# Patient Record
Sex: FEMALE | Race: WHITE | NOT HISPANIC OR LATINO | Employment: UNEMPLOYED | URBAN - METROPOLITAN AREA
[De-identification: names, ages, dates, MRNs, and addresses within clinical notes are randomized per-mention and may not be internally consistent; named-entity substitution may affect disease eponyms.]

---

## 2023-12-11 ENCOUNTER — OFFICE VISIT (OUTPATIENT)
Dept: PRIMARY CARE | Facility: CLINIC | Age: 58
End: 2023-12-11
Payer: COMMERCIAL

## 2023-12-11 ENCOUNTER — LAB (OUTPATIENT)
Dept: LAB | Facility: LAB | Age: 58
End: 2023-12-11
Payer: COMMERCIAL

## 2023-12-11 VITALS
OXYGEN SATURATION: 98 % | BODY MASS INDEX: 18.83 KG/M2 | DIASTOLIC BLOOD PRESSURE: 78 MMHG | WEIGHT: 120 LBS | HEART RATE: 64 BPM | SYSTOLIC BLOOD PRESSURE: 128 MMHG | TEMPERATURE: 97.5 F | HEIGHT: 67 IN

## 2023-12-11 DIAGNOSIS — Z00.00 ANNUAL PHYSICAL EXAM: ICD-10-CM

## 2023-12-11 DIAGNOSIS — Z12.31 ENCOUNTER FOR SCREENING MAMMOGRAM FOR BREAST CANCER: ICD-10-CM

## 2023-12-11 DIAGNOSIS — Z00.00 ANNUAL PHYSICAL EXAM: Primary | ICD-10-CM

## 2023-12-11 LAB
25(OH)D3 SERPL-MCNC: 55 NG/ML (ref 30–100)
ALBUMIN SERPL BCP-MCNC: 4.6 G/DL (ref 3.4–5)
ALP SERPL-CCNC: 49 U/L (ref 33–136)
ALT SERPL W P-5'-P-CCNC: 18 U/L (ref 7–45)
ANION GAP SERPL CALC-SCNC: 13 MMOL/L (ref 10–20)
AST SERPL W P-5'-P-CCNC: 20 U/L (ref 9–39)
BILIRUB SERPL-MCNC: 0.7 MG/DL (ref 0–1.2)
BUN SERPL-MCNC: 14 MG/DL (ref 6–23)
CALCIUM SERPL-MCNC: 9.8 MG/DL (ref 8.6–10.6)
CHLORIDE SERPL-SCNC: 101 MMOL/L (ref 98–107)
CHOLEST SERPL-MCNC: 158 MG/DL (ref 0–199)
CHOLESTEROL/HDL RATIO: 2.7
CO2 SERPL-SCNC: 28 MMOL/L (ref 21–32)
CREAT SERPL-MCNC: 0.53 MG/DL (ref 0.5–1.05)
EST. AVERAGE GLUCOSE BLD GHB EST-MCNC: 111 MG/DL
GFR SERPL CREATININE-BSD FRML MDRD: >90 ML/MIN/1.73M*2
GLUCOSE SERPL-MCNC: 101 MG/DL (ref 74–99)
HBA1C MFR BLD: 5.5 %
HCV AB SER QL: NONREACTIVE
HDLC SERPL-MCNC: 57.6 MG/DL
LDLC SERPL CALC-MCNC: 90 MG/DL
NON HDL CHOLESTEROL: 100 MG/DL (ref 0–149)
POTASSIUM SERPL-SCNC: 4.1 MMOL/L (ref 3.5–5.3)
PROT SERPL-MCNC: 7.4 G/DL (ref 6.4–8.2)
SODIUM SERPL-SCNC: 138 MMOL/L (ref 136–145)
TRIGL SERPL-MCNC: 53 MG/DL (ref 0–149)
TSH SERPL-ACNC: 2.31 MIU/L (ref 0.44–3.98)
VLDL: 11 MG/DL (ref 0–40)

## 2023-12-11 PROCEDURE — 80053 COMPREHEN METABOLIC PANEL: CPT

## 2023-12-11 PROCEDURE — 82306 VITAMIN D 25 HYDROXY: CPT

## 2023-12-11 PROCEDURE — 86803 HEPATITIS C AB TEST: CPT

## 2023-12-11 PROCEDURE — 84443 ASSAY THYROID STIM HORMONE: CPT

## 2023-12-11 PROCEDURE — 1036F TOBACCO NON-USER: CPT | Performed by: FAMILY MEDICINE

## 2023-12-11 PROCEDURE — 99386 PREV VISIT NEW AGE 40-64: CPT | Performed by: FAMILY MEDICINE

## 2023-12-11 PROCEDURE — 83036 HEMOGLOBIN GLYCOSYLATED A1C: CPT

## 2023-12-11 PROCEDURE — 80061 LIPID PANEL: CPT

## 2023-12-11 PROCEDURE — 36415 COLL VENOUS BLD VENIPUNCTURE: CPT

## 2023-12-11 RX ORDER — DORZOLAMIDE HYDROCHLORIDE AND TIMOLOL MALEATE 20; 5 MG/ML; MG/ML
SOLUTION/ DROPS OPHTHALMIC
COMMUNITY
End: 2024-04-04 | Stop reason: WASHOUT

## 2023-12-11 RX ORDER — CRISABOROLE 20 MG/G
OINTMENT TOPICAL 2 TIMES DAILY
COMMUNITY
End: 2024-04-04 | Stop reason: WASHOUT

## 2023-12-11 RX ORDER — CHOLECALCIFEROL (VITAMIN D3) 25 MCG
2000 TABLET ORAL
COMMUNITY
End: 2024-04-04 | Stop reason: WASHOUT

## 2023-12-11 ASSESSMENT — PATIENT HEALTH QUESTIONNAIRE - PHQ9
1. LITTLE INTEREST OR PLEASURE IN DOING THINGS: NOT AT ALL
2. FEELING DOWN, DEPRESSED OR HOPELESS: NOT AT ALL
SUM OF ALL RESPONSES TO PHQ9 QUESTIONS 1 AND 2: 0

## 2023-12-11 ASSESSMENT — COLUMBIA-SUICIDE SEVERITY RATING SCALE - C-SSRS
2. HAVE YOU ACTUALLY HAD ANY THOUGHTS OF KILLING YOURSELF?: NO
1. IN THE PAST MONTH, HAVE YOU WISHED YOU WERE DEAD OR WISHED YOU COULD GO TO SLEEP AND NOT WAKE UP?: NO
6. HAVE YOU EVER DONE ANYTHING, STARTED TO DO ANYTHING, OR PREPARED TO DO ANYTHING TO END YOUR LIFE?: NO

## 2023-12-11 ASSESSMENT — LIFESTYLE VARIABLES: HOW OFTEN DO YOU HAVE A DRINK CONTAINING ALCOHOL: 2-4 TIMES A MONTH

## 2023-12-11 NOTE — PROGRESS NOTES
"Subjective   Patient ID: Lynn Ross is a 58 y.o. female who presents for New Patient Visit (New patient is here to establish care. Need BW orders ).  Very pleasant 58-year-old here today to establish and here for wellness exam  No significant past medical history  Patient is not a smoker  Family history reviewed  Has not been to the doctor recently but feels well  No angina palpitations or syncope no exertional symptoms  No fever chills or night sweats  Patient is postmenopausal no bleeding no change in bowel habits no  issues takes a few supplements with no side effects        Review of Systems  Constitutional: no chills, no fever and no night sweats.   Eyes: no blurred vision and no eyesight problems.   ENT: no hearing loss, no nasal congestion, no nasal discharge, no hoarseness and no sore throat.   Cardiovascular: no chest pain, no intermittent leg claudication, no lower extremity edema, no palpitations and no syncope.   Respiratory: no cough, no shortness of breath during exertion, no shortness of breath at rest and no wheezing.   Gastrointestinal: no abdominal pain, no blood in stools, no constipation, no diarrhea, no melena, no nausea, no rectal pain and no vomiting.   Genitourinary: no dysuria, no change in urinary frequency, no urinary hesitancy, no feelings of urinary urgency and no vaginal discharge.   Musculoskeletal: no arthralgias,  no back pain and no myalgias.   Integumentary: no new skin lesions and no rashes.   Neurological: no difficulty walking, no headache, no limb weakness, no numbness and no tingling.   Psychiatric: no anxiety, no depression, no anhedonia and no substance use disorders.   Endocrine: no recent weight gain and no recent weight loss.   Hematologic/Lymphatic: no tendency for easy bruising and no swollen glands .    Objective    /78   Pulse 64   Temp 36.4 °C (97.5 °F)   Ht 1.702 m (5' 7\")   Wt 54.4 kg (120 lb)   SpO2 98%   BMI 18.79 kg/m²    Physical Exam  The " patient appeared well nourished and normally developed. Vital signs as documented. Head exam is unremarkable. No scleral icterus or corneal arcus noted.  Pupils are equal round reactive to light extraocular movements are intact no hemorrhages noted on funduscopic exam mouth mucous membranes are moist no exudates ears canals clear TMs are gray pearly not injected nose no rhinorrhea or epistaxis Neck is without jugular venous distension, thyromegaly, or carotid bruits. Carotid upstrokes are brisk bilaterally. Lungs are clear to auscultation and percussion. Cardiac exam reveals the PMI to be normally sized and situated. Rhythm is regular. First and second heart sounds normal. No murmurs, rubs or gallops. Abdominal exam reveals normal bowel sounds, no masses, no organomegaly and no aortic enlargement. Extremities are nonedematous and both femoral and pedal pulses are normal.  Neurologic exam DTRs are equal bilaterally no focal deficits strength is symmetrical heme lymph no palpable lymph nodes in the neck axilla or groin  Pes excavatum   Assessment/Plan   Problem List Items Addressed This Visit       Annual physical exam - Primary     Unremarkable physical exam  Continue annual exams         Relevant Orders    Comprehensive Metabolic Panel (Completed)    Lipid Panel (Completed)    Hemoglobin A1C (Completed)    Vitamin D 25-Hydroxy,Total (for eval of Vitamin D levels) (Completed)    Hepatitis C antibody (Completed)    TSH (Completed)     Other Visit Diagnoses       Encounter for screening mammogram for breast cancer        Relevant Orders    BI mammo bilateral screening tomosynthesis                 Ivet Mendez MD

## 2024-01-12 ENCOUNTER — ANCILLARY PROCEDURE (OUTPATIENT)
Dept: RADIOLOGY | Facility: CLINIC | Age: 59
End: 2024-01-12
Payer: COMMERCIAL

## 2024-01-12 DIAGNOSIS — Z00.00 ANNUAL PHYSICAL EXAM: Primary | ICD-10-CM

## 2024-01-12 DIAGNOSIS — Z12.31 ENCOUNTER FOR SCREENING MAMMOGRAM FOR BREAST CANCER: ICD-10-CM

## 2024-01-12 PROCEDURE — 77067 SCR MAMMO BI INCL CAD: CPT | Performed by: RADIOLOGY

## 2024-01-12 PROCEDURE — 77067 SCR MAMMO BI INCL CAD: CPT

## 2024-01-12 PROCEDURE — 77063 BREAST TOMOSYNTHESIS BI: CPT | Performed by: RADIOLOGY

## 2024-02-01 ENCOUNTER — HOSPITAL ENCOUNTER (OUTPATIENT)
Dept: RADIOLOGY | Facility: EXTERNAL LOCATION | Age: 59
Discharge: HOME | End: 2024-02-01

## 2024-02-13 LAB
NON-UH HIE BASO COUNT: 0.05 X1000 (ref 0–0.2)
NON-UH HIE BASOS %: 1.2 %
NON-UH HIE DIFF?: NO
NON-UH HIE EOS COUNT: 0.08 X1000 (ref 0–0.5)
NON-UH HIE EOSIN %: 2 %
NON-UH HIE HCT: 42.1 % (ref 36–46)
NON-UH HIE HGB: 14 G/DL (ref 12–16)
NON-UH HIE INSTR WBC: 4.3
NON-UH HIE LYMPH %: 25 %
NON-UH HIE LYMPH COUNT: 1.07 X1000 (ref 1.2–4.8)
NON-UH HIE MCH: 31.7 PG (ref 27–34)
NON-UH HIE MCHC: 33.2 G/DL (ref 32–37)
NON-UH HIE MCV: 95.3 FL (ref 80–100)
NON-UH HIE MONO %: 11.5 %
NON-UH HIE MONO COUNT: 0.49 X1000 (ref 0.1–1)
NON-UH HIE MPV: 8.2 FL (ref 7.4–10.4)
NON-UH HIE NEUTROPHIL %: 60.3 %
NON-UH HIE NEUTROPHIL COUNT (ANC): 2.57 X1000 (ref 1.4–8.8)
NON-UH HIE NUCLEATED RBC: 0 /100WBC
NON-UH HIE PLATELET: 344 X10 (ref 150–450)
NON-UH HIE RBC: 4.42 X10 (ref 4.2–5.4)
NON-UH HIE RDW: 13.5 % (ref 11.5–14.5)
NON-UH HIE WBC: 4.3 X10 (ref 4.5–11)

## 2024-02-20 ENCOUNTER — APPOINTMENT (OUTPATIENT)
Dept: OPHTHALMOLOGY | Facility: CLINIC | Age: 59
End: 2024-02-20
Payer: COMMERCIAL

## 2024-02-22 ENCOUNTER — HOSPITAL ENCOUNTER (OUTPATIENT)
Dept: RADIOLOGY | Facility: CLINIC | Age: 59
Discharge: HOME | End: 2024-02-22
Payer: COMMERCIAL

## 2024-02-22 ENCOUNTER — OFFICE VISIT (OUTPATIENT)
Dept: ORTHOPEDIC SURGERY | Facility: CLINIC | Age: 59
End: 2024-02-22
Payer: COMMERCIAL

## 2024-02-22 DIAGNOSIS — M51.36 LUMBAR DEGENERATIVE DISC DISEASE: ICD-10-CM

## 2024-02-22 DIAGNOSIS — M54.50 LOW BACK PAIN, UNSPECIFIED BACK PAIN LATERALITY, UNSPECIFIED CHRONICITY, UNSPECIFIED WHETHER SCIATICA PRESENT: ICD-10-CM

## 2024-02-22 DIAGNOSIS — M54.50 LOW BACK PAIN, UNSPECIFIED BACK PAIN LATERALITY, UNSPECIFIED CHRONICITY, UNSPECIFIED WHETHER SCIATICA PRESENT: Primary | ICD-10-CM

## 2024-02-22 PROCEDURE — 1036F TOBACCO NON-USER: CPT | Performed by: ORTHOPAEDIC SURGERY

## 2024-02-22 PROCEDURE — 99214 OFFICE O/P EST MOD 30 MIN: CPT | Performed by: ORTHOPAEDIC SURGERY

## 2024-02-22 PROCEDURE — 99204 OFFICE O/P NEW MOD 45 MIN: CPT | Performed by: ORTHOPAEDIC SURGERY

## 2024-02-22 PROCEDURE — 72110 X-RAY EXAM L-2 SPINE 4/>VWS: CPT

## 2024-02-22 PROCEDURE — 72110 X-RAY EXAM L-2 SPINE 4/>VWS: CPT | Performed by: RADIOLOGY

## 2024-02-22 NOTE — PROGRESS NOTES
Lynn Ross is a 59 y.o. female who presents for Pain of the Lower Back (New pt w/ xrays today/Pt states has been diagnosed as lumbar DDD years ago).    HPI:  59-year-old female here for new patient visit with low back pain.  She denies any fever chills nausea vomiting night sweats.  She has no bowel or bladder complaints.  Notes from Dr. Mendez from her wellness visit from December 11, 2023 was reviewed today.  Patient is a non-smoker, she is not diabetic and has no major contraindicating factors to surgery.    Physical exam:  Well-nourished, well kept.No lymphangitis or lymphadenopathy in the examined extremities.  Gait normal.  Can stand on heels and toes.   Examination of the back shows tenderness in the right lumbosacral paraspinous musculature.  There is very minimally decreased range of motion in all directions due to guarding/muscle spasms and pain at extremes.  There is good strength and no instability.  Examination of the lower extremities reveals no point tenderness, swelling, or deformity.  Range of motion of the hips, knees, and ankles are full without crepitance, instability, or exacerbation of pain.  Strength is 5/5 throughout.  No redness, abrasions, or lesions on extremities  Gross sensation intact in the extremities.  Deep tendon reflexes 2+ bilateral. Clonus negative.  Affect normal.  Alert and oriented ×3.  Coordination normal.    Imaging studies:  AP lateral flexion-extension plain films were ordered and reviewed today.    Assessment:  59-year-old female here for new patient visit with low back pain.  No radicular component.  She has had chronic back pain for quite some time, she has been diagnosed in the past with degenerative disc disease.  [place] and her back pain flared up.  Bending twisting and sitting on a hard floor aggravates it.  It is right-sided lumbosacral pain without a radiating component.  She has not done any conservative  treatment recently however she is interested in getting back into physical therapy.  No history of back surgery or epidural injections.  This is an undiagnosed new problem with unknown prognosis.    Plan:  For complete plan and/or surgical details, please refer to Dr. Trujillo's portion of this split dictation.    In a face-to-face encounter, I performed a history and physical examination, discussed pertinent diagnostic studies if indicated, and discussed diagnosis and management strategies with both the patient and the midlevel provider.  I reviewed the midlevel's note and agree with the documented findings and plan of care.      Back pain only.  Patient has degenerative changes in the lumbar spine with a scoliosis but no significant abnormalities.  No neurologic symptoms.  This is best treated with physical therapy.  Will get her into some physical therapy and she can follow-up with us on a as needed basis if she is no better.

## 2024-04-03 ENCOUNTER — TELEPHONE (OUTPATIENT)
Dept: PRIMARY CARE | Facility: CLINIC | Age: 59
End: 2024-04-03

## 2024-04-03 NOTE — TELEPHONE ENCOUNTER
Patient called patients bloodwork WBC was low and needs another order for her to get the test redrawn, states she was told from doc that these need to be re-drawn month after first order. She needs another order.

## 2024-04-04 DIAGNOSIS — D72.819 LEUKOPENIA, UNSPECIFIED TYPE: Primary | ICD-10-CM

## 2024-04-04 PROBLEM — Q67.6 PECTUS EXCAVATUM: Status: ACTIVE | Noted: 2024-04-04

## 2024-04-04 PROBLEM — R76.8 ELEVATED ANTINUCLEAR ANTIBODY (ANA) LEVEL: Status: ACTIVE | Noted: 2018-01-16

## 2024-04-04 PROBLEM — A49.1 GROUP B STREPTOCOCCAL INFECTION: Status: RESOLVED | Noted: 2018-07-18 | Resolved: 2024-04-04

## 2024-04-04 PROBLEM — S83.90XA SPRAIN OF KNEE: Status: RESOLVED | Noted: 2024-04-04 | Resolved: 2024-04-04

## 2024-04-04 PROBLEM — G44.209 TENSION-TYPE HEADACHE: Status: RESOLVED | Noted: 2024-04-04 | Resolved: 2024-04-04

## 2024-04-04 PROBLEM — R03.0 ELEVATED BLOOD-PRESSURE READING WITHOUT DIAGNOSIS OF HYPERTENSION: Status: RESOLVED | Noted: 2022-06-13 | Resolved: 2024-04-04

## 2024-04-04 PROBLEM — K57.92 DIVERTICULITIS: Status: RESOLVED | Noted: 2024-04-04 | Resolved: 2024-04-04

## 2024-04-04 PROBLEM — R10.2 PELVIC PAIN IN FEMALE: Status: RESOLVED | Noted: 2018-07-18 | Resolved: 2024-04-04

## 2024-04-04 PROBLEM — H40.059 OCULAR HYPERTENSION: Status: RESOLVED | Noted: 2019-06-05 | Resolved: 2024-04-04

## 2024-04-04 PROBLEM — I34.1 NONRHEUMATIC MITRAL (VALVE) PROLAPSE: Status: ACTIVE | Noted: 2022-06-13

## 2024-04-04 PROBLEM — E74.9 DISORDER OF CARBOHYDRATE METABOLISM (MULTI): Status: RESOLVED | Noted: 2022-01-18 | Resolved: 2024-04-04

## 2024-04-04 PROBLEM — N95.2 VAGINAL ATROPHY: Status: ACTIVE | Noted: 2019-03-11

## 2024-04-04 PROBLEM — E55.9 VITAMIN D DEFICIENCY: Status: ACTIVE | Noted: 2019-09-30

## 2024-04-04 PROBLEM — N39.43 POST-VOID DRIBBLING: Status: RESOLVED | Noted: 2019-03-11 | Resolved: 2024-04-04

## 2024-04-04 PROBLEM — M85.80 OSTEOPENIA: Status: RESOLVED | Noted: 2019-06-26 | Resolved: 2024-04-04

## 2024-04-04 PROBLEM — I49.3 PVC'S (PREMATURE VENTRICULAR CONTRACTIONS): Status: ACTIVE | Noted: 2022-06-13

## 2024-04-04 PROBLEM — I49.8 VENTRICULAR BIGEMINY: Status: ACTIVE | Noted: 2022-06-13

## 2024-04-04 PROBLEM — J06.9 UPPER RESPIRATORY INFECTION: Status: RESOLVED | Noted: 2024-04-04 | Resolved: 2024-04-04

## 2024-04-04 PROBLEM — R73.9 HYPERGLYCEMIA: Status: RESOLVED | Noted: 2024-04-04 | Resolved: 2024-04-04

## 2024-04-04 PROBLEM — Z91.040 LATEX ALLERGY: Status: RESOLVED | Noted: 2024-04-04 | Resolved: 2024-04-04

## 2024-04-04 RX ORDER — DICLOFENAC SODIUM 75 MG/1
1 TABLET, DELAYED RELEASE ORAL DAILY
COMMUNITY

## 2024-04-04 RX ORDER — LATANOPROST 50 UG/ML
1 SOLUTION/ DROPS OPHTHALMIC NIGHTLY
COMMUNITY
Start: 2024-01-26

## 2024-04-09 ENCOUNTER — LAB (OUTPATIENT)
Dept: LAB | Facility: LAB | Age: 59
End: 2024-04-09
Payer: COMMERCIAL

## 2024-04-09 DIAGNOSIS — D72.819 LEUKOPENIA, UNSPECIFIED TYPE: ICD-10-CM

## 2024-04-09 PROCEDURE — 36415 COLL VENOUS BLD VENIPUNCTURE: CPT

## 2024-04-09 PROCEDURE — 85025 COMPLETE CBC W/AUTO DIFF WBC: CPT

## 2024-04-10 LAB
BASOPHILS # BLD AUTO: 0.07 X10*3/UL (ref 0–0.1)
BASOPHILS NFR BLD AUTO: 1.6 %
EOSINOPHIL # BLD AUTO: 0.03 X10*3/UL (ref 0–0.7)
EOSINOPHIL NFR BLD AUTO: 0.7 %
ERYTHROCYTE [DISTWIDTH] IN BLOOD BY AUTOMATED COUNT: 13.4 % (ref 11.5–14.5)
HCT VFR BLD AUTO: 42.4 % (ref 36–46)
HGB BLD-MCNC: 13.7 G/DL (ref 12–16)
IMM GRANULOCYTES # BLD AUTO: 0.01 X10*3/UL (ref 0–0.7)
IMM GRANULOCYTES NFR BLD AUTO: 0.2 % (ref 0–0.9)
LYMPHOCYTES # BLD AUTO: 1.18 X10*3/UL (ref 1.2–4.8)
LYMPHOCYTES NFR BLD AUTO: 27.6 %
MCH RBC QN AUTO: 31.2 PG (ref 26–34)
MCHC RBC AUTO-ENTMCNC: 32.3 G/DL (ref 32–36)
MCV RBC AUTO: 97 FL (ref 80–100)
MONOCYTES # BLD AUTO: 0.47 X10*3/UL (ref 0.1–1)
MONOCYTES NFR BLD AUTO: 11 %
NEUTROPHILS # BLD AUTO: 2.51 X10*3/UL (ref 1.2–7.7)
NEUTROPHILS NFR BLD AUTO: 58.9 %
NRBC BLD-RTO: 0 /100 WBCS (ref 0–0)
PLATELET # BLD AUTO: 313 X10*3/UL (ref 150–450)
RBC # BLD AUTO: 4.39 X10*6/UL (ref 4–5.2)
WBC # BLD AUTO: 4.3 X10*3/UL (ref 4.4–11.3)